# Patient Record
Sex: FEMALE | Race: WHITE | Employment: FULL TIME | URBAN - METROPOLITAN AREA
[De-identification: names, ages, dates, MRNs, and addresses within clinical notes are randomized per-mention and may not be internally consistent; named-entity substitution may affect disease eponyms.]

---

## 2023-03-07 ENCOUNTER — TELEPHONE (OUTPATIENT)
Dept: ADMINISTRATIVE | Facility: OTHER | Age: 38
End: 2023-03-07

## 2023-03-07 NOTE — TELEPHONE ENCOUNTER
----- Message from Danuta 79 sent at 3/7/2023  7:59 AM EST -----  Regarding: Thin Prep pap  03/07/23 8:00 AM    Hello, our patient Desi Godfrey has had Pap Smear (HPV) aka Cervical Cancer Screening completed/performed  Please assist in updating the patient chart by pulling the document from Lab Tab within Chart Review  The date of service is 1/26/2018      Thank you,  Faizan Soto  [facility] PRIMARY CARE

## 2023-03-07 NOTE — TELEPHONE ENCOUNTER
Upon review of the In Basket request we have found that the patient has not yet had the requested item completed or has not established care  Due to protocols, we are unable to hold requests for resulting/linking of a future items and are unable to proceed  Patients who have not established care within the Network do not have consents on file, record requests, etc     Any additional questions or concerns should be emailed to the Practice Liaisons via the appropriate education email address, please do not reply via In Basket      Thank you  Wayne Martell

## 2023-03-09 ENCOUNTER — OFFICE VISIT (OUTPATIENT)
Dept: FAMILY MEDICINE CLINIC | Facility: CLINIC | Age: 38
End: 2023-03-09

## 2023-03-09 VITALS
DIASTOLIC BLOOD PRESSURE: 86 MMHG | BODY MASS INDEX: 26.61 KG/M2 | OXYGEN SATURATION: 98 % | RESPIRATION RATE: 16 BRPM | WEIGHT: 150.2 LBS | HEART RATE: 94 BPM | TEMPERATURE: 97.6 F | SYSTOLIC BLOOD PRESSURE: 130 MMHG | HEIGHT: 63 IN

## 2023-03-09 DIAGNOSIS — F41.9 ANXIETY: Primary | ICD-10-CM

## 2023-03-09 DIAGNOSIS — T88.7XXA MEDICATION SIDE EFFECTS: ICD-10-CM

## 2023-03-09 DIAGNOSIS — E66.3 OVERWEIGHT (BMI 25.0-29.9): ICD-10-CM

## 2023-03-09 PROBLEM — N80.9 ENDOMETRIOSIS: Status: ACTIVE | Noted: 2018-07-23

## 2023-03-09 RX ORDER — LAMOTRIGINE 25 MG/1
2 TABLET, CHEWABLE ORAL DAILY
COMMUNITY
Start: 2022-12-19

## 2023-03-09 NOTE — PATIENT INSTRUCTIONS
Anxiety   AMBULATORY CARE:   Anxiety  is a condition that causes you to feel extremely worried or nervous  The feelings are so strong that they can cause problems with your daily activities or sleep  Anxiety may be triggered by something you fear, or it may happen without a cause  Family or work stress, smoking, caffeine, and alcohol can increase your risk for anxiety  Certain medicines or health conditions can also increase your risk  Anxiety can become a long-term condition if it is not managed or treated  Common signs and symptoms that may occur with anxiety:   Fatigue or muscle tightness    Shaking, restlessness, or irritability    Problems focusing    Trouble sleeping    Feeling jumpy, easily startled, or dizzy    Rapid heartbeat or shortness of breath    Call your local emergency number (911 in the [address]) if:   You have chest pain, tightness, or heaviness that may spread to your shoulders, arms, jaw, neck, or back  You feel like hurting yourself or someone else  Call your doctor if:   Your symptoms get worse or do not get better with treatment  Your anxiety keeps you from doing your regular daily activities  You have new symptoms since your last visit  You have questions or concerns about your condition or care  Treatment for anxiety  may include medicines to help you feel calm and relaxed, and decrease your symptoms  Medicines are usually given together with therapy or other treatments  Manage anxiety:   Talk to someone about your anxiety  Your healthcare provider may suggest counseling  Cognitive behavioral therapy can help you understand and change how you react to events that trigger your symptoms  You might feel more comfortable talking with a friend or family member about your anxiety  Choose someone you know will be supportive and encouraging  Find ways to relax  Activities such as exercise, meditation, or listening to music can help you relax   Spend time with friends, or do things you enjoy  Practice deep breathing  Deep breathing can help you relax when you feel anxious  Focus on taking slow, deep breaths several times a day, or during an anxiety attack  Breathe in through your nose and out through your mouth  Create a regular sleep routine  Regular sleep can help you feel calmer during the day  Go to sleep and wake up at the same times every day  Do not watch television or use the computer right before bed  Your room should be comfortable, dark, and quiet  Eat a variety of healthy foods  Healthy foods include fruits, vegetables, low-fat dairy products, lean meats, fish, whole-grain breads, and cooked beans  Healthy foods can help you feel less anxious and have more energy  Exercise regularly  Exercise can increase your energy level  Exercise may also lift your mood and help you sleep better  Your healthcare provider can help you create an exercise plan  Do not smoke  Nicotine and other chemicals in cigarettes and cigars can increase anxiety  Ask your healthcare provider for information if you currently smoke and need help to quit  E-cigarettes or smokeless tobacco still contain nicotine  Talk to your healthcare provider before you use these products  Do not have caffeine  Caffeine can make your symptoms worse  Do not have foods or drinks that are meant to increase your energy level  Limit or do not drink alcohol  Ask your healthcare provider if alcohol is safe for you  You may not be able to drink alcohol if you take certain anxiety or depression medicines  Limit alcohol to 1 drink per day if you are a woman  Limit alcohol to 2 drinks per day if you are a man  A drink of alcohol is 12 ounces of beer, 5 ounces of wine, or 1½ ounces of liquor  Do not use drugs  Drugs can make your anxiety worse  It can also make anxiety hard to manage  Talk to your healthcare provider if you use drugs and want help to quit         Follow up with your doctor within 2 weeks or as directed:  Write down your questions so you remember to ask them during your visits  © Copyright [employer] March 2022 Information is for End User's use only and may not be sold, redistributed or otherwise used for commercial purposes  The above information is an  only  It is not intended as medical advice for individual conditions or treatments  Talk to your doctor, nurse or pharmacist before following any medical regimen to see if it is safe and effective for you

## 2023-03-09 NOTE — ASSESSMENT & PLAN NOTE
Patient was advised to restart counseling I discussed at length the difference between bipolar 1 and 2 as well as the use of lamictal for anxiety I also discussed side effects of medication I have also given her 3 options maintain current medication with adding counseling  I also discussed weaning off and starting effexor and the pros and cons of that I also suggested genetic testing with [employer] to assist in better choosing medication She will do some research on those options She and I will discuss via phone on Monday and will make a decision

## 2023-03-09 NOTE — PROGRESS NOTES
Name: Denae Raza      : 1985      MRN: [id number]  Encounter Provider: Petr Madison DO  Encounter Date: 3/9/2023   Encounter department: [facility] PRIMARY CARE    Assessment & Plan     1  Anxiety  Assessment & Plan:  Patient was advised to restart counseling I discussed at length the difference between bipolar 1 and 2 as well as the use of lamictal for anxiety I also discussed side effects of medication I have also given her 3 options maintain current medication with adding counseling  I also discussed weaning off and starting effexor and the pros and cons of that I also suggested genetic testing with [employer] to assist in better choosing medication She will do some research on those options She and I will discuss via phone on Monday and will make a decision      2  Medication side effects  Assessment & Plan: With her issues of skin and also the libido I do think that is potentially due to medication       3  Overweight (BMI 25 0-29  9)  Assessment & Plan:  Discussed diet and exercise as well as repeat labs in fall of this year       BMI Counseling: Body mass index is 27 03 kg/m²  The BMI is above normal  Nutrition recommendations include decreasing portion sizes, encouraging healthy choices of fruits and vegetables and moderation in carbohydrate intake  Exercise recommendations include exercising 3-5 times per week  Rationale for BMI follow-up plan is due to patient being overweight or obese           Subjective      Patient is here as a new patient today Patient has been seen by [facility] doctor Patient has been being treated for anxiety and some concentration issues 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 one of which has special needs Patient also relates per background that she has had some bigger stressors with she was younger Patient had a recent career change She has worked in [employer] but left after covid She is now an  Patient had a lot of difficulty with preparing for the test Patient was finding poor concentration excessive worry and it was causing her to have sleep issues She was concerned possible ADD Patient was referred to psychologist  They did a one hour interview Per patient they felt likely general anxiety but possible bipolar Patient was then begun on lamictal by her PCP Patient is taking it faithfully She has been at current dose since 11/2022 Patient notes decrease in her worry However her  and her best friend do not Patient is concerned about being on lamictal as it is mood stabilizer She also notes sex drive is effected Patient is not sure the medication is helping or if passing the insurance exam made her feel better She had a metabolic lab workup prior to starting the lamictal She is looking for a second opinion about this She has a GYN and yearly PE last fall     Review of Systems   Constitutional: Negative for activity change and appetite change  Respiratory: Negative for shortness of breath  Cardiovascular: Negative for chest pain and palpitations  Gastrointestinal: Negative for constipation, nausea and vomiting  Genitourinary:        Decrease libido   Skin:        Intermittently having skin out breaks   Psychiatric/Behavioral: Positive for decreased concentration  Negative for agitation, confusion, dysphoric mood and sleep disturbance  The patient is nervous/anxious  Current Outpatient Medications on File Prior to Visit   Medication Sig   • lamoTRIgine (LaMICtal) 25 MG CHEW Chew 2 tablets daily       Objective     /86 (BP Location: Right arm, Patient Position: Sitting, Cuff Size: Adult)   Pulse 94   Temp 97 6 °F (36 4 °C) (Temporal)   Resp 16   Ht 5' 2 5" (1 588 m)   Wt 68 1 kg (150 lb 3 2 oz)   LMP 02/16/2023   SpO2 98%   BMI 27 03 kg/m²     Physical Exam  Vitals and nursing note reviewed  Constitutional:       Appearance: Normal appearance  HENT:      Head: Normocephalic        Right Ear: Tympanic membrane and external ear normal       Left Ear: Tympanic membrane and external ear normal    Eyes:      Extraocular Movements: Extraocular movements intact  Conjunctiva/sclera: Conjunctivae normal       Pupils: Pupils are equal, round, and reactive to light  Cardiovascular:      Rate and Rhythm: Normal rate and regular rhythm  Heart sounds: Normal heart sounds  Pulmonary:      Effort: Pulmonary effort is normal       Breath sounds: Normal breath sounds  Musculoskeletal:      Cervical back: Normal range of motion  Skin:     Findings: No rash  Neurological:      General: No focal deficit present  Mental Status: She is alert and oriented to person, place, and time  Psychiatric:         Mood and Affect: Mood normal          Behavior: Behavior normal          Thought Content:  Thought content normal          Judgment: Judgment normal        Lige Levers, DO

## 2023-03-13 ENCOUNTER — TELEPHONE (OUTPATIENT)
Dept: FAMILY MEDICINE CLINIC | Facility: CLINIC | Age: 38
End: 2023-03-13

## 2023-03-19 ENCOUNTER — HOSPITAL ENCOUNTER (EMERGENCY)
Facility: HOSPITAL | Age: 38
Discharge: HOME | End: 2023-03-19
Attending: EMERGENCY MEDICINE | Admitting: EMERGENCY MEDICINE

## 2023-03-19 VITALS
RESPIRATION RATE: 18 BRPM | WEIGHT: 140 LBS | DIASTOLIC BLOOD PRESSURE: 97 MMHG | TEMPERATURE: 97.9 F | BODY MASS INDEX: 25.2 KG/M2 | HEART RATE: 84 BPM | SYSTOLIC BLOOD PRESSURE: 158 MMHG | OXYGEN SATURATION: 98 %

## 2023-03-19 DIAGNOSIS — S61.012A THUMB LACERATION, LEFT, INITIAL ENCOUNTER: Primary | ICD-10-CM

## 2023-03-19 RX ORDER — GINSENG 100 MG
1 CAPSULE ORAL ONCE
Status: COMPLETED | OUTPATIENT
Start: 2023-03-19 | End: 2023-03-19

## 2023-03-19 RX ORDER — LIDOCAINE HYDROCHLORIDE 10 MG/ML
5 INJECTION, SOLUTION EPIDURAL; INFILTRATION; INTRACAUDAL; PERINEURAL ONCE
Status: COMPLETED | OUTPATIENT
Start: 2023-03-19 | End: 2023-03-19

## 2023-03-19 RX ADMIN — BACITRACIN 1 SMALL APPLICATION: 500 OINTMENT TOPICAL at 14:45

## 2023-03-19 RX ADMIN — LIDOCAINE HYDROCHLORIDE 5 ML: 10 INJECTION, SOLUTION EPIDURAL; INFILTRATION; INTRACAUDAL; PERINEURAL at 14:20

## 2023-03-19 NOTE — DISCHARGE INSTRUCTIONS
Keep stitches dry for the next 2 days  After 2 days you may wash with soap and water and apply antibiotic ointment  Be careful not to pull stiches out when cleaning  Stitches need to be removed in 7 days   Can be done by PCP, urgent care or come back to ER  Return to the ER if your stitches fall out and the area keeps rebleeding despite pressure > 10 min, redness, warmth, swelling, red streaking, purulent discharge, cant move or feel thumb, thumb turns cold, numb, blue, fevers, chest pain, shortness of breath

## 2023-03-19 NOTE — ED PROVIDER NOTES
tHistory  Chief Complaint   Patient presents with   • Finger Laceration     Pt to ED following finger lac to L thumb  Pt was cutting cheese w serrated knife and cut top of finger  Pt sent by urgent care, states finger Is still bleeding     This is a 43 y/o female with no pertinent PMH who presents to the ER with left thumb laceration  Patient states she was cutting cheese prior to coming and it slipped and hit her thumb next to her nail  She states it was bleeding right after but she immediately covered it  Went to urgent care but was sent here  She states she has mild throbbing sensation in her thumb  Denies numbness, tingling, decreased sensation redness, swelling, decreased ROM  No known allergies  Tetanus UTD 2015      History provided by:  Patient   used: No    Finger Laceration  Location:  Finger  Finger laceration location:  L thumb  Length:  3  Depth: Through dermis  Quality: jagged    Bleeding: controlled with pressure    Time since incident:  1 hour  Laceration mechanism:  Knife  Pain details:     Quality:  Throbbing    Severity:  Mild    Timing:  Intermittent    Progression:  Waxing and waning  Foreign body present:  No foreign bodies  Tetanus status:  Up to date  Associated symptoms: no fever, no focal weakness, no numbness, no rash, no redness, no swelling and no streaking        Prior to Admission Medications   Prescriptions Last Dose Informant Patient Reported? Taking?   lamoTRIgine (LaMICtal) 25 MG CHEW   Yes No   Sig: Chew 2 tablets daily      Facility-Administered Medications: None       Past Medical History:   Diagnosis Date   • Sepsis ([facility] [address] ) 07/2018       History reviewed  No pertinent surgical history  Family History   Problem Relation Age of Onset   • Heart disease Maternal Grandmother      I have reviewed and agree with the history as documented      E-Cigarette/Vaping   • E-Cigarette Use Never User      E-Cigarette/Vaping Substances   • Nicotine No    • THC No    • CBD No    • Flavoring No    • Other No    • Unknown No      Social History     Tobacco Use   • Smoking status: Never     Passive exposure: Never   • Smokeless tobacco: Never   Vaping Use   • Vaping Use: Never used   Substance Use Topics   • Alcohol use: Yes     Comment: socially   • Drug use: Never       Review of Systems   Constitutional: Negative for chills and fever  Respiratory: Negative for shortness of breath  Cardiovascular: Negative for chest pain  Gastrointestinal: Negative for nausea and vomiting  Skin: Positive for wound  Negative for color change and rash  Neurological: Negative for focal weakness, weakness, light-headedness and numbness  Psychiatric/Behavioral: Negative for behavioral problems and sleep disturbance  All other systems reviewed and are negative  Physical Exam  Physical Exam  Vitals and nursing note reviewed  Constitutional:       General: She is awake  Appearance: Normal appearance  She is well-developed  HENT:      Head: Normocephalic and atraumatic  Right Ear: External ear normal       Left Ear: External ear normal       Nose: Nose normal    Eyes:      General: No scleral icterus  Extraocular Movements: Extraocular movements intact  Cardiovascular:      Rate and Rhythm: Normal rate and regular rhythm  Heart sounds: Normal heart sounds, S1 normal and S2 normal  No murmur heard  No gallop  Pulmonary:      Effort: Pulmonary effort is normal       Breath sounds: Normal breath sounds  No wheezing, rhonchi or rales  Musculoskeletal:         General: Normal range of motion  Cervical back: Normal range of motion  Comments: Full ROM 5/5 strength NV intact left thumb  Skin:     General: Skin is warm and dry  Findings: Laceration present  Comments: 3 cm flap laceration lateral to left thumb nail  Controlled with pressure  Only through epidermis and dermis with no underlying tissue involved   No signs of infection Neurological:      General: No focal deficit present  Mental Status: She is alert  Psychiatric:         Attention and Perception: Attention and perception normal          Mood and Affect: Mood normal          Behavior: Behavior normal  Behavior is cooperative  Vital Signs  ED Triage Vitals [03/19/23 1343]   Temperature Pulse Respirations Blood Pressure SpO2   97 9 °F (36 6 °C) 84 18 158/97 98 %      Temp src Heart Rate Source Patient Position - Orthostatic VS BP Location FiO2 (%)   -- -- Sitting Left arm --      Pain Score       4           Vitals:    03/19/23 1343   BP: 158/97   Pulse: 84   Patient Position - Orthostatic VS: Sitting         Visual Acuity      ED Medications  Medications   lidocaine (PF) (XYLOCAINE-MPF) 1 % injection 5 mL (has no administration in time range)   bacitracin topical ointment 1 small application (has no administration in time range)       Diagnostic Studies  Results Reviewed     None                 No orders to display              Procedures  Laceration repair    Date/Time: 3/19/2023 2:57 PM  Performed by: Feliz Matthews PA-C  Authorized by: Feliz Matthews PA-C   Consent: Verbal consent obtained  Risks and benefits: risks, benefits and alternatives were discussed  Consent given by: patient  Patient identity confirmed: verbally with patient  Body area: upper extremity  Location details: left thumb  Laceration length: 3 cm  Foreign bodies: no foreign bodies  Tendon involvement: none  Nerve involvement: none  Anesthesia: local infiltration and digital block    Anesthesia:  Local Anesthetic: lidocaine 1% without epinephrine  Anesthetic total: 4 mL      Procedure Details:  Preparation: Patient was prepped and draped in the usual sterile fashion    Irrigation solution: saline  Irrigation method: syringe  Amount of cleaning: standard  Skin closure: 5-0 nylon, glue and Steri-Strips  Number of sutures: 5  Technique: simple  Approximation: close  Approximation difficulty: simple  Dressing: gauze roll and antibiotic ointment  Patient tolerance: patient tolerated the procedure well with no immediate complications  Comments: Steri strips applied to nail bed with glue on top               ED Course                 Medical Decision Making  43 y/o female with left thumb laceration  Clinical diagnosis no need for imaging clean laceration  Plan: laceration repaired at bedside  Patient given care instructions and advised on suture removal  She  was given strict return to ER precautions both verbally and in discharge papers  Patient verbalized understanding and agrees with plan  Risk  OTC drugs  Prescription drug management  Disposition  Final diagnoses:   Thumb laceration, left, initial encounter     Time reflects when diagnosis was documented in both MDM as applicable and the Disposition within this note     Time User Action Codes Description Comment    3/19/2023  2:49 PM Santana Downs Add [S61 012A] Thumb laceration, left, initial encounter       ED Disposition     ED Disposition   Discharge    Condition   Stable    Date/Time   Sun Mar 19, 2023  2:49 PM    Comment   Chalino Wynn discharge to home/self care                 Follow-up Information     Follow up With Specialties Details Why Contact Info Additional Information    Eliane Patient, DO Family Medicine In 1 week For suture removal [address]  [address] [facility] Emergency Department Emergency Medicine Go to  if your stitches fall out and the area keeps rebleeding despite pressure > 10 min, redness, warmth, swelling, red streaking, purulent discharge, cant move or feel thumb, thumb turns cold, numb, blue, fevers, chest pain, shortness of breath [address] [facility] Emergency Department, [address] [place] [address] [address] Patient's Medications   Discharge Prescriptions    No medications on file       No discharge procedures on file      PDMP Review     None          ED Provider  Electronically Signed by           Martha Garcia PA-C  03/19/23 5691

## 2023-08-01 ENCOUNTER — OFFICE VISIT (OUTPATIENT)
Dept: URGENT CARE | Facility: CLINIC | Age: 38
End: 2023-08-01
Payer: COMMERCIAL

## 2023-08-01 VITALS
HEART RATE: 111 BPM | WEIGHT: 140 LBS | DIASTOLIC BLOOD PRESSURE: 96 MMHG | RESPIRATION RATE: 14 BRPM | BODY MASS INDEX: 24.8 KG/M2 | HEIGHT: 63 IN | TEMPERATURE: 100.4 F | OXYGEN SATURATION: 100 % | SYSTOLIC BLOOD PRESSURE: 138 MMHG

## 2023-08-01 DIAGNOSIS — R50.9 FEVER, UNSPECIFIED FEVER CAUSE: Primary | ICD-10-CM

## 2023-08-01 DIAGNOSIS — H65.93 BILATERAL OTITIS MEDIA WITH EFFUSION: ICD-10-CM

## 2023-08-01 LAB
SARS-COV-2 AG UPPER RESP QL IA: NEGATIVE
VALID CONTROL: NORMAL

## 2023-08-01 PROCEDURE — 87811 SARS-COV-2 COVID19 W/OPTIC: CPT | Performed by: PHYSICIAN ASSISTANT

## 2023-08-01 PROCEDURE — 99213 OFFICE O/P EST LOW 20 MIN: CPT | Performed by: PHYSICIAN ASSISTANT

## 2023-08-01 RX ORDER — AMOXICILLIN 875 MG/1
875 TABLET, COATED ORAL 2 TIMES DAILY
Qty: 20 TABLET | Refills: 0 | Status: SHIPPED | OUTPATIENT
Start: 2023-08-01 | End: 2023-08-11

## 2023-08-01 RX ORDER — FLUTICASONE PROPIONATE 50 MCG
1 SPRAY, SUSPENSION (ML) NASAL DAILY
Qty: 15.8 ML | Refills: 0 | Status: SHIPPED | OUTPATIENT
Start: 2023-08-01

## 2023-08-01 NOTE — PROGRESS NOTES
[facility]        NAME: Jennifer Romero is a 42 y.o. female  : 1985    MRN: [id number]  DATE: 2023  TIME: 3:04 PM    Assessment and Plan   Fever, unspecified fever cause [R50.9]  1. Fever, unspecified fever cause  Poct Covid 19 Rapid Antigen Test    fluticasone (FLONASE) 50 mcg/act nasal spray    amoxicillin (AMOXIL) 875 mg tablet      2. Bilateral otitis media with effusion  fluticasone (FLONASE) 50 mcg/act nasal spray    amoxicillin (AMOXIL) 875 mg tablet            Patient Instructions     May use otc tylenol or motrin for fever, pain. Can also restart otc antihistamine (zyrtec) which she has at home. Follow up with PCP in 3-5 days. Proceed to  ER if symptoms worsen. Chief Complaint     Chief Complaint   Patient presents with   • Fever     Pt says the fever started yesterday afternoon. Felt terrible and then took Tylenol. History of Present Illness     HPI   Patient is a 43 yo female who presents with 1.5 days of fever. She reports feeling awful. She reports that her ears have been bothering her for 2 weeks. She has seasonal allergies but has not been bothering her lately, has not been taking anything otc. She reports tenderness in anterior lymph nodes of b/l neck. Her neck feels sore, no headaches or stiffness of neck. No sore throat, reports this does not feel like strep when she has had it in the past. She an occasional dry cough without sob. She reports abd pain but feels this is related to her menstrual cycle. No vomiting, diarrhea. She has poor appetite. Review of Systems   Review of Systems   Constitutional: Positive for appetite change, fatigue and fever. HENT: Positive for ear pain. Negative for congestion, ear discharge, sinus pressure, sinus pain, sore throat and trouble swallowing. Eyes: Negative. Respiratory: Positive for cough. Negative for shortness of breath and wheezing. Cardiovascular: Negative.     Gastrointestinal: Positive for abdominal pain (feels this is menstrual related). Negative for diarrhea, nausea and vomiting. Genitourinary: Negative for difficulty urinating and dysuria. Musculoskeletal: Positive for back pain and neck pain. Negative for neck stiffness. Skin: Negative. Neurological: Negative. Negative for headaches. Current Medications       Current Outpatient Medications:   •  amoxicillin (AMOXIL) 875 mg tablet, Take 1 tablet (875 mg total) by mouth 2 (two) times a day for 10 days, Disp: 20 tablet, Rfl: 0  •  fluticasone (FLONASE) 50 mcg/act nasal spray, 1 spray into each nostril daily, Disp: 15.8 mL, Rfl: 0  •  lamoTRIgine (LaMICtal) 25 MG CHEW, Chew 2 tablets daily, Disp: , Rfl:     Current Allergies     Allergies as of 08/01/2023   • (No Known Allergies)            The following portions of the patient's history were reviewed and updated as appropriate: allergies, current medications, past family history, past medical history, past social history, past surgical history and problem list.     Past Medical History:   Diagnosis Date   • Sepsis ([address]) 07/2018       History reviewed. No pertinent surgical history. Family History   Problem Relation Age of Onset   • Heart disease Maternal Grandmother          Medications have been verified. Objective   /96   Pulse (!) 111   Temp 100.4 °F (38 °C)   Resp 14   Ht 5' 3" (1.6 m)   Wt 63.5 kg (140 lb)   SpO2 100%   BMI 24.80 kg/m²   No LMP recorded. Physical Exam     Physical Exam  Constitutional:       General: She is not in acute distress. Appearance: Normal appearance. She is ill-appearing. HENT:      Head: Normocephalic and atraumatic. Right Ear: Ear canal and external ear normal. A middle ear effusion is present. Tympanic membrane is injected. Left Ear: Ear canal and external ear normal. A middle ear effusion is present. Tympanic membrane is injected.       Nose: Nose normal.      Mouth/Throat:      Mouth: Mucous membranes are moist.      Pharynx: Oropharynx is clear. No oropharyngeal exudate or posterior oropharyngeal erythema. Eyes:      General:         Right eye: No discharge. Left eye: No discharge. Conjunctiva/sclera: Conjunctivae normal.   Cardiovascular:      Rate and Rhythm: Regular rhythm. Tachycardia present. Heart sounds: No murmur heard. Pulmonary:      Effort: Pulmonary effort is normal. No respiratory distress. Breath sounds: Normal breath sounds. No wheezing. Abdominal:      General: Bowel sounds are normal. There is no distension. Palpations: Abdomen is soft. Tenderness: There is no abdominal tenderness. Musculoskeletal:         General: No deformity. Cervical back: Normal range of motion and neck supple. No rigidity. Lymphadenopathy:      Cervical: Cervical adenopathy (b/l anterior) present. Skin:     General: Skin is warm and dry. Coloration: Skin is pale. Neurological:      General: No focal deficit present. Mental Status: She is alert and oriented to person, place, and time.    Psychiatric:         Mood and Affect: Mood normal.         Behavior: Behavior normal.

## 2023-08-01 NOTE — LETTER
August 1, 2023     Patient: Marguerite Quesada  YOB: 1985  Date of Visit: 8/1/2023      To Whom it May Concern:    Lars Hendrix is under my professional care. Alexus Horn was seen in my office on 8/1/2023. Alexus Horn may be excused from work 8/1 and 8/2. If you have any questions or concerns, please don't hesitate to call.          Sincerely,          Abhishek Whatley PA-C

## 2024-07-31 ENCOUNTER — TELEPHONE (OUTPATIENT)
Dept: FAMILY MEDICINE CLINIC | Facility: CLINIC | Age: 39
End: 2024-07-31

## 2025-06-18 ENCOUNTER — TELEPHONE (OUTPATIENT)
Dept: FAMILY MEDICINE CLINIC | Facility: CLINIC | Age: 40
End: 2025-06-18

## 2025-06-18 NOTE — TELEPHONE ENCOUNTER
Patient established care with [facility] - [address]    . Please update PCP field.